# Patient Record
Sex: FEMALE | Race: WHITE | NOT HISPANIC OR LATINO | Employment: FULL TIME | URBAN - NONMETROPOLITAN AREA
[De-identification: names, ages, dates, MRNs, and addresses within clinical notes are randomized per-mention and may not be internally consistent; named-entity substitution may affect disease eponyms.]

---

## 2019-05-22 ENCOUNTER — OFFICE VISIT (OUTPATIENT)
Dept: CARDIOLOGY | Facility: CLINIC | Age: 74
End: 2019-05-22

## 2019-05-22 VITALS
SYSTOLIC BLOOD PRESSURE: 146 MMHG | WEIGHT: 181 LBS | HEIGHT: 64 IN | HEART RATE: 70 BPM | BODY MASS INDEX: 30.9 KG/M2 | DIASTOLIC BLOOD PRESSURE: 80 MMHG

## 2019-05-22 DIAGNOSIS — E88.81 METABOLIC SYNDROME: ICD-10-CM

## 2019-05-22 DIAGNOSIS — R07.89 CHEST TIGHTNESS: ICD-10-CM

## 2019-05-22 DIAGNOSIS — I44.7 LBBB (LEFT BUNDLE BRANCH BLOCK): Primary | ICD-10-CM

## 2019-05-22 DIAGNOSIS — I10 ESSENTIAL HYPERTENSION: ICD-10-CM

## 2019-05-22 DIAGNOSIS — K21.9 CHEST PAIN DUE TO GERD: ICD-10-CM

## 2019-05-22 DIAGNOSIS — R07.9 CHEST PAIN DUE TO GERD: ICD-10-CM

## 2019-05-22 DIAGNOSIS — E78.00 HYPERCHOLESTEREMIA: ICD-10-CM

## 2019-05-22 DIAGNOSIS — R00.2 PALPITATIONS: ICD-10-CM

## 2019-05-22 PROCEDURE — 93000 ELECTROCARDIOGRAM COMPLETE: CPT | Performed by: INTERNAL MEDICINE

## 2019-05-22 PROCEDURE — 99204 OFFICE O/P NEW MOD 45 MIN: CPT | Performed by: INTERNAL MEDICINE

## 2019-05-22 RX ORDER — HYDROXYCHLOROQUINE SULFATE 200 MG/1
TABLET, FILM COATED ORAL 2 TIMES DAILY
COMMUNITY
End: 2020-05-19 | Stop reason: ALTCHOICE

## 2019-05-22 RX ORDER — AMLODIPINE BESYLATE 5 MG/1
5 TABLET ORAL DAILY
Qty: 30 TABLET | Refills: 11 | Status: SHIPPED | OUTPATIENT
Start: 2019-05-22 | End: 2020-05-19 | Stop reason: SDUPTHER

## 2019-05-22 RX ORDER — DICYCLOMINE HYDROCHLORIDE 10 MG/1
10 CAPSULE ORAL
Qty: 30 CAPSULE | Refills: 8 | Status: SHIPPED | OUTPATIENT
Start: 2019-05-22 | End: 2021-07-14

## 2019-05-22 RX ORDER — PAROXETINE HYDROCHLORIDE 20 MG/1
20 TABLET, FILM COATED ORAL EVERY MORNING
COMMUNITY

## 2019-05-22 RX ORDER — PRAVASTATIN SODIUM 10 MG
10 TABLET ORAL DAILY
COMMUNITY
End: 2020-11-19 | Stop reason: SDUPTHER

## 2019-05-22 RX ORDER — LISINOPRIL AND HYDROCHLOROTHIAZIDE 25; 20 MG/1; MG/1
1 TABLET ORAL DAILY
COMMUNITY
End: 2020-11-19 | Stop reason: SDUPTHER

## 2019-05-22 RX ORDER — ERGOCALCIFEROL 1.25 MG/1
50000 CAPSULE ORAL WEEKLY
COMMUNITY

## 2019-05-22 RX ORDER — MELOXICAM 15 MG/1
15 TABLET ORAL DAILY
COMMUNITY
End: 2020-05-19 | Stop reason: ALTCHOICE

## 2019-05-22 RX ORDER — ESOMEPRAZOLE MAGNESIUM 40 MG/1
40 CAPSULE, DELAYED RELEASE ORAL
COMMUNITY

## 2019-05-22 NOTE — PATIENT INSTRUCTIONS
Mediterranean Diet  A Mediterranean diet refers to food and lifestyle choices that are based on the traditions of countries located on the [place]. This way of eating has been shown to help prevent certain conditions and improve outcomes for people who have chronic diseases, like kidney disease and heart disease.  What are tips for following this plan?  Lifestyle  · Cook and eat meals together with your family, when possible.  · Drink enough fluid to keep your urine clear or pale yellow.  · Be physically active every day. This includes:  ? Aerobic exercise like running or swimming.  ? Leisure activities like gardening, walking, or housework.  · Get 7-8 hours of sleep each night.  · If recommended by your health care provider, drink red wine in moderation. This means 1 glass a day for nonpregnant women and 2 glasses a day for men. A glass of wine equals 5 oz (150 mL).  Reading food labels  · Check the serving size of packaged foods. For foods such as rice and pasta, the serving size refers to the amount of cooked product, not dry.  · Check the total fat in packaged foods. Avoid foods that have saturated fat or trans fats.  · Check the ingredients list for added sugars, such as corn syrup.  Shopping  · At the grocery store, buy most of your food from the areas near the walls of the store. This includes:  ? Fresh fruits and vegetables (produce).  ? Grains, beans, nuts, and seeds. Some of these may be available in unpackaged forms or large amounts (in bulk).  ? Fresh seafood.  ? Poultry and eggs.  ? Low-fat dairy products.  · Buy whole ingredients instead of prepackaged foods.  · Buy fresh fruits and vegetables in-season from local farmers markets.  · Buy frozen fruits and vegetables in resealable bags.  · If you do not have access to quality fresh seafood, buy precooked frozen shrimp or canned fish, such as tuna, salmon, or sardines.  · Buy small amounts of raw or cooked vegetables, salads, or olives from the  deli or salad bar at your store.  · Stock your pantry so you always have certain foods on hand, such as olive oil, canned tuna, canned tomatoes, rice, pasta, and beans.  Cooking  · Cook foods with extra-virgin olive oil instead of using butter or other vegetable oils.  · Have meat as a side dish, and have vegetables or grains as your main dish. This means having meat in small portions or adding small amounts of meat to foods like pasta or stew.  · Use beans or vegetables instead of meat in common dishes like chili or lasagna.  · [place] with different cooking methods. Try roasting or broiling vegetables instead of steaming or sautéeing them.  · Add frozen vegetables to soups, stews, pasta, or rice.  · Add nuts or seeds for added healthy fat at each meal. You can add these to yogurt, salads, or vegetable dishes.  · Marinate fish or vegetables using olive oil, lemon juice, garlic, and fresh herbs.  Meal planning  · Plan to eat 1 vegetarian meal one day each week. Try to work up to 2 vegetarian meals, if possible.  · Eat seafood 2 or more times a week.  · Have healthy snacks readily available, such as:  ? Vegetable sticks with hummus.  ? Greek yogurt.  ? Fruit and nut trail mix.  · Eat balanced meals throughout the week. This includes:  ? Fruit: 2-3 servings a day  ? Vegetables: 4-5 servings a day  ? Low-fat dairy: 2 servings a day  ? Fish, poultry, or lean meat: 1 serving a day  ? Beans and legumes: 2 or more servings a week  ? Nuts and seeds: 1-2 servings a day  ? Whole grains: 6-8 servings a day  ? Extra-virgin olive oil: 3-4 servings a day  · Limit red meat and sweets to only a few servings a month  What are my food choices?  · Mediterranean diet  ? Recommended  ? Grains: Whole-grain pasta. Brown rice. Bulgar wheat. Polenta. Couscous. Whole-wheat bread. Oatmeal. Quinoa.  ? Vegetables: Artichokes. Beets. Broccoli. Cabbage. Carrots. Eggplant. Green beans. Chard. Kale. Spinach. Onions. Leeks. Peas. Squash.  Tomatoes. Peppers. Radishes.  ? Fruits: Apples. Apricots. Avocado. Berries. Bananas. Cherries. Dates. Figs. Grapes. Yousif. Melon. Oranges. Peaches. Plums. Pomegranate.  ? Meats and other protein foods: Beans. Almonds. Sunflower seeds. Pine nuts. Peanuts. Cod. [place]. Scallops. Shrimp. Tuna. Tilapia. Clams. Oysters. Eggs.  ? Dairy: Low-fat milk. Cheese. Greek yogurt.  ? Beverages: Water. Red wine. Herbal tea.  ? Fats and oils: Extra virgin olive oil. Avocado oil. Grape seed oil.  ? Sweets and desserts: Greek yogurt with honey. Baked apples. Poached pears. Trail mix.  ? Seasoning and other foods: Basil. Cilantro. Coriander. Cumin. Mint. Parsley. Pk. Rosemary. Tarragon. Garlic. Oregano. Thyme. Pepper. Balsalmic vinegar. Tahini. Hummus. Tomato sauce. Olives. Mushrooms.  ? Limit these  ? Grains: Prepackaged pasta or rice dishes. Prepackaged cereal with added sugar.  ? Vegetables: Deep fried potatoes (french fries).  ? Fruits: Fruit canned in syrup.  ? Meats and other protein foods: Beef. Pork. Lamb. Poultry with skin. Hot dogs. Car.  ? Dairy: Ice cream. Sour cream. Whole milk.  ? Beverages: Juice. Sugar-sweetened soft drinks. Beer. Liquor and spirits.  ? Fats and oils: Butter. Canola oil. Vegetable oil. Beef fat (tallow). Lard.  ? Sweets and desserts: Cookies. Cakes. Pies. Candy.  ? Seasoning and other foods: Mayonnaise. Premade sauces and marinades.  ? The items listed may not be a complete list. Talk with your dietitian about what dietary choices are right for you.  Summary  · The Mediterranean diet includes both food and lifestyle choices.  · Eat a variety of fresh fruits and vegetables, beans, nuts, seeds, and whole grains.  · Limit the amount of red meat and sweets that you eat.  · Talk with your health care provider about whether it is safe for you to drink red wine in moderation. This means 1 glass a day for nonpregnant women and 2 glasses a day for men. A glass of wine equals 5 oz (150 mL).  This information  is not intended to replace advice given to you by your health care provider. Make sure you discuss any questions you have with your health care provider.  Document Released: 08/10/2017 Document Revised: 09/12/2017 Document Reviewed: 08/10/2017  [employer] Interactive Patient Education © 2019 [employer]

## 2019-05-29 ENCOUNTER — HOSPITAL ENCOUNTER (OUTPATIENT)
Dept: CARDIOLOGY | Facility: HOSPITAL | Age: 74
Discharge: HOME | End: 2019-05-29

## 2019-05-29 DIAGNOSIS — R07.89 CHEST TIGHTNESS: ICD-10-CM

## 2019-05-29 DIAGNOSIS — I44.7 LBBB (LEFT BUNDLE BRANCH BLOCK): ICD-10-CM

## 2019-05-29 LAB
BH CV NUCLEAR PRIOR STUDY: 3
BH CV STRESS COMMENTS STAGE 1: NORMAL
BH CV STRESS DOSE REGADENOSON STAGE 1: 0.4
BH CV STRESS DURATION MIN STAGE 1: 0
BH CV STRESS DURATION SEC STAGE 1: 10
BH CV STRESS PROTOCOL 1: NORMAL
BH CV STRESS RECOVERY BP: NORMAL MMHG
BH CV STRESS RECOVERY HR: 85 BPM
BH CV STRESS STAGE 1: 1
LV EF NUC BP: 69 %
MAXIMAL PREDICTED HEART RATE: 146 BPM
PERCENT MAX PREDICTED HR: 64.38 %
STRESS BASELINE BP: NORMAL MMHG
STRESS BASELINE HR: 73 BPM
STRESS PERCENT HR: 76 %
STRESS POST PEAK BP: NORMAL MMHG
STRESS POST PEAK HR: 94 BPM
STRESS TARGET HR: 124 BPM

## 2019-05-29 PROCEDURE — 93018 CV STRESS TEST I&R ONLY: CPT | Performed by: INTERNAL MEDICINE

## 2019-05-29 PROCEDURE — 0 TECHNETIUM SESTAMIBI: Performed by: INTERNAL MEDICINE

## 2019-05-29 PROCEDURE — 78452 HT MUSCLE IMAGE SPECT MULT: CPT | Performed by: INTERNAL MEDICINE

## 2019-05-29 PROCEDURE — 78452 HT MUSCLE IMAGE SPECT MULT: CPT

## 2019-05-29 PROCEDURE — 25010000002 REGADENOSON 0.4 MG/5ML SOLUTION: Performed by: INTERNAL MEDICINE

## 2019-05-29 PROCEDURE — 93017 CV STRESS TEST TRACING ONLY: CPT

## 2019-05-29 PROCEDURE — A9500 TC99M SESTAMIBI: HCPCS | Performed by: INTERNAL MEDICINE

## 2019-05-29 RX ADMIN — REGADENOSON 0.4 MG: 0.08 INJECTION, SOLUTION INTRAVENOUS at 08:42

## 2019-05-29 RX ADMIN — TECHNETIUM TC 99M SESTAMIBI 1 DOSE: 1 INJECTION INTRAVENOUS at 08:42

## 2019-05-29 RX ADMIN — TECHNETIUM TC 99M SESTAMIBI 1 DOSE: 1 INJECTION INTRAVENOUS at 08:41

## 2019-11-18 ENCOUNTER — OFFICE VISIT (OUTPATIENT)
Dept: CARDIOLOGY | Facility: CLINIC | Age: 74
End: 2019-11-18

## 2019-11-18 VITALS
BODY MASS INDEX: 31.48 KG/M2 | SYSTOLIC BLOOD PRESSURE: 120 MMHG | HEART RATE: 68 BPM | HEIGHT: 64 IN | DIASTOLIC BLOOD PRESSURE: 70 MMHG | WEIGHT: 184.4 LBS

## 2019-11-18 DIAGNOSIS — I10 ESSENTIAL HYPERTENSION: Primary | ICD-10-CM

## 2019-11-18 DIAGNOSIS — E78.00 HYPERCHOLESTEREMIA: ICD-10-CM

## 2019-11-18 DIAGNOSIS — R00.2 PALPITATIONS: ICD-10-CM

## 2019-11-18 DIAGNOSIS — I44.7 LBBB (LEFT BUNDLE BRANCH BLOCK): ICD-10-CM

## 2019-11-18 PROCEDURE — 99214 OFFICE O/P EST MOD 30 MIN: CPT | Performed by: NURSE PRACTITIONER

## 2019-11-18 NOTE — PROGRESS NOTES
"Chief Complaint   Patient presents with   • Follow-up     Cardiac management. She states \"I can not tell any difference since starting Bentyl, I feel it is just what I eat\".    • Blood pressure     She reports B/P is fluctuating, 97//60s. HR 60s-80s.   • Fatigue     She states \"just don't have any energy\".   • Irregular Heart Beat     At times feels like heart skips a beat.       Subjective       Jaki Camacho is a 74 y.o. female with a history of HTN, hyperlipidemia, who was referred for cardiac evaluation in May 2019 secondary to chest tightness and dyspnea on exertion. She also reported occasional self-limiting palpitations occurring 2-3 weekly lasting a few seconds. She had an EKG which showed LBBB. Echo done by her PCP showed possible septal wall motion abnormality and was referred for further evaluation. Blood pressure was mildly elevated at consult, amlodipine 5 mg added. Stress test was done to rule out ischemia. Lexiscan on 5/29/19 showed normal LVEF with changes in the septum felt to be related to the BBB but could not rule out infarct. No ischemia was seen. Medical management recommended unless she developed more symptoms, then cardiac cath would be recommended. Lipids have been well controlled with LDL 86 on low dose pravastatin. She came today for follow up. Overall, she feels improved. She denies chest pain. She has mild dyspnea with walking longer distances or uphill. She does her housework without difficulty as long as she paces herself. BP has been mostly well controlled at home. Occasionally increases to 150-160 systolic. Rare palpitations. Labs to be rechecked with PCP.   HPI         Cardiac History:    Past Surgical History:   Procedure Laterality Date   • CARDIOVASCULAR STRESS TEST  05/29/2019    L.Cardiolite- EF 69%. Septal Infarct Vs LBBB.   • ECHO - CONVERTED  04/25/2019    @ [facility]. EF 65%. ? Septal WMA. AO- 3.7 Cm. RVSP- 38 mmHg.       Current Outpatient Medications   Medication Sig " Dispense Refill   • amLODIPine (NORVASC) 5 MG tablet Take 1 tablet by mouth Daily. 30 tablet 11   • Aspirin Effervescent (KADE-SELTZER PO) Take  by mouth 2 (Two) Times a Day As Needed.     • dicyclomine (BENTYL) 10 MG capsule Take 1 capsule by mouth every night at bedtime. 30 capsule 8   • esomeprazole (nexIUM) 40 MG capsule Take 40 mg by mouth Every Morning Before Breakfast.     • hydroxychloroquine (PLAQUENIL) 200 MG tablet Take  by mouth 2 (Two) Times a Day.     • lisinopril-hydrochlorothiazide (PRINZIDE,ZESTORETIC) 20-25 MG per tablet Take 1 tablet by mouth Daily.     • meloxicam (MOBIC) 15 MG tablet Take 15 mg by mouth Daily.     • PARoxetine (PAXIL) 20 MG tablet Take 20 mg by mouth Every Morning.     • pravastatin (PRAVACHOL) 10 MG tablet Take 10 mg by mouth Daily.     • vitamin D (ERGOCALCIFEROL) 74350 units capsule capsule Take 50,000 Units by mouth 1 (One) Time Per Week.       No current facility-administered medications for this visit.        Patient has no known allergies.    Past Medical History:   Diagnosis Date   • Abnormal ECG    • GERD (gastroesophageal reflux disease)    • History of partial thyroidectomy    • Hyperlipidemia    • Hypertension    • Osteoarthritis    • Vitamin D deficiency        Social History     Socioeconomic History   • Marital status:      Spouse name: Not on file   • Number of children: Not on file   • Years of education: Not on file   • Highest education level: Not on file   Tobacco Use   • Smoking status: Never Smoker   • Smokeless tobacco: Never Used   Substance and Sexual Activity   • Alcohol use: No     Frequency: Never   • Drug use: No       Family History   Problem Relation Age of Onset   • Heart attack Mother          at 80 with MI   • Heart failure Mother    • Heart attack Father          at 82 with MI   • Heart failure Father    • Valvular heart disease Brother         valve replacement at 67 years old   • Heart disease Brother         CABG    • Cancer  "Brother    • No Known Problems Daughter      Review of Systems   Constitution: Positive for weight gain (up 3 lb). Negative for decreased appetite, weakness and malaise/fatigue.   HENT: Negative.    Eyes: Negative.    Cardiovascular: Positive for dyspnea on exertion (mild ) and palpitations. Negative for chest pain, leg swelling and syncope.   Respiratory: Negative for cough and shortness of breath.    Endocrine: Negative.    Hematologic/Lymphatic: Negative.    Skin: Negative.    Musculoskeletal: Negative for falls and myalgias.   Gastrointestinal: Negative for abdominal pain and melena.   Genitourinary: Negative for dysuria and hematuria.   Neurological: Negative for dizziness.   Psychiatric/Behavioral: Negative for altered mental status and depression.   Allergic/Immunologic: Negative.       Diabetes- No  Thyroid-normal    Objective     /70 (BP Location: Left arm)   Pulse 68   Ht 162.6 cm (64.02\")   Wt 83.6 kg (184 lb 6.4 oz)   BMI 31.64 kg/m²     Physical Exam   Constitutional: She is oriented to person, place, and time. She appears well-developed and well-nourished. No distress.   HENT:   Head: Normocephalic.   Eyes: Pupils are equal, round, and reactive to light.   Neck: Normal range of motion.   Cardiovascular: Normal rate, regular rhythm and intact distal pulses.   Pulmonary/Chest: Effort normal and breath sounds normal. No respiratory distress.   Abdominal: Soft. Bowel sounds are normal.   Musculoskeletal: Normal range of motion. She exhibits no edema.   Neurological: She is alert and oriented to person, place, and time.   Skin: Skin is warm and dry. She is not diaphoretic.   Psychiatric: She has a normal mood and affect.   Nursing note and vitals reviewed.     Procedures          Assessment/Plan      Jaki was seen today for follow-up, blood pressure, fatigue and irregular heart beat.    Diagnoses and all orders for this visit:    Essential hypertension    Hypercholesteremia    LBBB (left bundle " branch block)    Palpitations      Heart rate and blood pressure stable. She is tolerating amlodipine without side effects. We reviewed her stress test and echocardiogram which showed possible septal infarct vs LBBB, no ischemia. Her LV function is normal. She has no new or worsening symptoms, so we will continue medical management at this time. She understands to report any anginal symptoms. Continue pravastatin for management of lipids as LDL well controlled. She plans to follow with Angela Pizarro for management of labs. Palpitations are infrequent and not associated with dizziness or CP, no further intervention. TSH was normal. She may need a holter in the future if worsen. She was encouraged on heart healthy lifestyle, limiting sodium, saturated fat, sugar. Regular walking encouraged. She appears to be stable from a cardiac standpoint. We will see her back in six months or sooner if she develops any new symptoms.     Patient's Body mass index is 31.64 kg/m². BMI is above normal parameters. Recommendations include: nutrition counseling.               Electronically signed by KAREN Estevez,  November 20, 2019 6:58 AM

## 2020-02-25 ENCOUNTER — TELEPHONE (OUTPATIENT)
Dept: CARDIOLOGY | Facility: CLINIC | Age: 75
End: 2020-02-25

## 2020-02-25 NOTE — TELEPHONE ENCOUNTER
Patient called stating that Dr. Marvin took her off of mobic and plaquenil stating that she shouldn't take them due to her heart. He put her on tylenol and tramadol. She is asking if you think there is a reason she shouldn't take mobic or plaquenil? She states that tramadol and tylenol are not helping her arthritis pain.

## 2020-02-25 NOTE — TELEPHONE ENCOUNTER
Her stress test showed no ischemia and blood pressure is well controlled. She has LBBB but her QT is normal.     I don't see an obvious reason why she could not take Plaquenil and Mobic unless he knows something that I do not.     We can forward the last note to his office and let him take a look at her diagnoses.

## 2020-02-27 NOTE — TELEPHONE ENCOUNTER
Patient was made aware that I will send Dr. Marvin's office our last office note and that Mirela did not see an obvious reason that she could not take mobic and plaquenil. She states that she will be following up with their office in April 2020.

## 2020-03-26 ENCOUNTER — TELEPHONE (OUTPATIENT)
Dept: CARDIOLOGY | Facility: CLINIC | Age: 75
End: 2020-03-26

## 2020-03-26 DIAGNOSIS — R00.2 PALPITATIONS: Primary | ICD-10-CM

## 2020-03-26 NOTE — TELEPHONE ENCOUNTER
Pt's niece, Adelaide Camacho, NP, called reported symptoms of increased palpitations, intermittent, doesn't last long but occurs 10-15 times per day. No chest discomfort, shortness of breath, orthopnea, or edema but has mild dyspnea on exertion and fatigue. -160 systolic, HR 70-80.     Reviewed cardiac work up findings with NP. She will add low dose beta blocker. Metoprolol succ 25 mg QD. Will order 3 day holter to be mailed to patient home to evaluate palpitations.

## 2020-05-19 ENCOUNTER — OFFICE VISIT (OUTPATIENT)
Dept: CARDIOLOGY | Facility: CLINIC | Age: 75
End: 2020-05-19

## 2020-05-19 VITALS
SYSTOLIC BLOOD PRESSURE: 110 MMHG | HEIGHT: 64 IN | DIASTOLIC BLOOD PRESSURE: 58 MMHG | TEMPERATURE: 98.4 F | HEART RATE: 68 BPM | BODY MASS INDEX: 32.58 KG/M2 | WEIGHT: 190.8 LBS

## 2020-05-19 DIAGNOSIS — R53.83 OTHER FATIGUE: ICD-10-CM

## 2020-05-19 DIAGNOSIS — E78.00 HYPERCHOLESTEREMIA: ICD-10-CM

## 2020-05-19 DIAGNOSIS — I10 ESSENTIAL HYPERTENSION: ICD-10-CM

## 2020-05-19 DIAGNOSIS — R00.2 PALPITATIONS: ICD-10-CM

## 2020-05-19 DIAGNOSIS — I44.7 LBBB (LEFT BUNDLE BRANCH BLOCK): Primary | ICD-10-CM

## 2020-05-19 DIAGNOSIS — E66.9 OBESITY (BMI 30.0-34.9): ICD-10-CM

## 2020-05-19 PROCEDURE — 99214 OFFICE O/P EST MOD 30 MIN: CPT | Performed by: NURSE PRACTITIONER

## 2020-05-19 RX ORDER — CELECOXIB 50 MG/1
50 CAPSULE ORAL DAILY
COMMUNITY

## 2020-05-19 RX ORDER — AMLODIPINE BESYLATE 5 MG/1
5 TABLET ORAL DAILY
Qty: 90 TABLET | Refills: 2 | Status: SHIPPED | OUTPATIENT
Start: 2020-05-19 | End: 2020-11-19 | Stop reason: SDUPTHER

## 2020-05-19 RX ORDER — METOPROLOL SUCCINATE 25 MG/1
25 TABLET, EXTENDED RELEASE ORAL DAILY
Qty: 90 TABLET | Refills: 2 | Status: SHIPPED | OUTPATIENT
Start: 2020-05-19 | End: 2020-11-19 | Stop reason: SDUPTHER

## 2020-05-19 RX ORDER — METOPROLOL SUCCINATE 25 MG/1
25 TABLET, EXTENDED RELEASE ORAL DAILY
Qty: 90 TABLET | Refills: 2 | Status: SHIPPED | OUTPATIENT
Start: 2020-05-19 | End: 2020-05-19 | Stop reason: SDUPTHER

## 2020-05-19 RX ORDER — METOPROLOL SUCCINATE 25 MG/1
25 TABLET, EXTENDED RELEASE ORAL DAILY
COMMUNITY
End: 2020-05-19 | Stop reason: SDUPTHER

## 2020-05-19 RX ORDER — AMLODIPINE BESYLATE 5 MG/1
5 TABLET ORAL DAILY
Qty: 30 TABLET | Refills: 11 | Status: SHIPPED | OUTPATIENT
Start: 2020-05-19 | End: 2020-05-19 | Stop reason: SDUPTHER

## 2020-05-19 NOTE — PROGRESS NOTES
Chief Complaint   Patient presents with   • Follow-up     Cardiac management.   • Lab     Last labs 1/2020 per PCP.   • Blood pressure     Readings 99/61 to 136/66 with HR 60-69 since starting Toprol XL. Has felt more tired since starting Toprol.   • Med Refill     Needs refills on Norvasc and Toprol xl-90 day. Patient verbalized current medication list.       Cardiac Complaints  fatigue      Subjective   Jaki Camacho is a 75 y.o. female  with HTN, palpitations, BBB, and hyperlipidemia, who was referred for cardiac evaluation in May 2019 secondary to chest tightness and dyspnea on exertion. She also reported occasional self-limiting palpitations occurring 2-3 weekly lasting a few seconds. She had an EKG which showed LBBB. Echo done by her PCP showed possible septal wall motion abnormality and was referred for further evaluation. Blood pressure was mildly elevated at consult, amlodipine 5 mg added. Stress test was done to rule out ischemia. Lexiscan on 5/29/19 showed normal LVEF with changes in the septum felt to be related to the BBB but could not rule out infarct. No ischemia was seen. Medical management recommended unless she developed more symptoms, then cardiac cath would be recommended.  She then called in March 2020 with more palpitations, toprol XL added for management. Holter done in April 2020 showed rare PVCs with 2 short runs of SVT, toprol XL continued at current.     She returns today for follow up and reports continued fatigue. She does feel like it has worsened slightly since starting BB therapy. She does report some issues with blood pressure variation and admits it often runs in the 90's SBP.  She denies any chest pain, palpitations, SOA, dizziness, or syncope. Labs she states remain followed by PCP and are to be rechecked this summer. Refills of norvasc and toprol requested.         Cardiac History  Past Surgical History:   Procedure Laterality Date   • CARDIOVASCULAR STRESS TEST  05/29/2019     L.Cardiolite- EF 69%. Septal Infarct Vs LBBB.   • ECHO - CONVERTED  2019    @ [facility]. EF 65%. ? Septal WMA. AO- 3.7 Cm. RVSP- 38 mmHg.       Current Outpatient Medications   Medication Sig Dispense Refill   • amLODIPine (NORVASC) 5 MG tablet Take 1 tablet by mouth Daily. 90 tablet 2   • Aspirin Effervescent (KADE-SELTZER PO) Take  by mouth 2 (Two) Times a Day As Needed.     • celecoxib (CeleBREX) 50 MG capsule Take 50 mg by mouth Daily.     • dicyclomine (BENTYL) 10 MG capsule Take 1 capsule by mouth every night at bedtime. 30 capsule 8   • esomeprazole (nexIUM) 40 MG capsule Take 40 mg by mouth Every Morning Before Breakfast.     • lisinopril-hydrochlorothiazide (PRINZIDE,ZESTORETIC) 20-25 MG per tablet Take 1 tablet by mouth Daily.     • metoprolol succinate XL (TOPROL-XL) 25 MG 24 hr tablet Take 1 tablet by mouth Daily. 90 tablet 2   • PARoxetine (PAXIL) 20 MG tablet Take 20 mg by mouth Every Morning.     • pravastatin (PRAVACHOL) 10 MG tablet Take 10 mg by mouth Daily.     • vitamin D (ERGOCALCIFEROL) 54294 units capsule capsule Take 50,000 Units by mouth 1 (One) Time Per Week.       No current facility-administered medications for this visit.        Patient has no known allergies.    Past Medical History:   Diagnosis Date   • Abnormal ECG    • GERD (gastroesophageal reflux disease)    • History of partial thyroidectomy    • Hyperlipidemia    • Hypertension    • Osteoarthritis    • Vitamin D deficiency        Social History     Socioeconomic History   • Marital status:      Spouse name: Not on file   • Number of children: Not on file   • Years of education: Not on file   • Highest education level: Not on file   Tobacco Use   • Smoking status: Never Smoker   • Smokeless tobacco: Never Used   Substance and Sexual Activity   • Alcohol use: No     Frequency: Never   • Drug use: No       Family History   Problem Relation Age of Onset   • Heart attack Mother          at 80 with MI   • Heart failure  "Mother    • Heart attack Father          at 82 with MI   • Heart failure Father    • Valvular heart disease Brother         valve replacement at 67 years old   • Heart disease Brother         CABG    • Cancer Brother    • No Known Problems Daughter        Review of Systems   Constitution: Positive for malaise/fatigue and night sweats.   Cardiovascular: Negative for chest pain, claudication, dyspnea on exertion, irregular heartbeat, leg swelling, near-syncope, palpitations and syncope.   Respiratory: Negative for cough, shortness of breath and wheezing.    Musculoskeletal: Negative for back pain, joint pain and joint swelling.   Gastrointestinal: Negative for anorexia, heartburn, melena, nausea and vomiting.   Genitourinary: Negative for dysuria, hematuria, hesitancy and nocturia.   Neurological: Negative for dizziness, light-headedness and loss of balance.   Psychiatric/Behavioral: Negative for depression and memory loss. The patient is not nervous/anxious.            Objective     /58 (BP Location: Right arm)   Pulse 68   Temp 98.4 °F (36.9 °C)   Ht 162.6 cm (64.02\")   Wt 86.5 kg (190 lb 12.8 oz)   BMI 32.73 kg/m²     Physical Exam   Constitutional: She is oriented to person, place, and time. She appears well-developed and well-nourished.   HENT:   Head: Normocephalic and atraumatic.   Eyes: Pupils are equal, round, and reactive to light. EOM are normal.   Neck: Normal range of motion. Neck supple.   Cardiovascular: Normal rate and regular rhythm.   Murmur heard.  Pulmonary/Chest: Effort normal and breath sounds normal.   Abdominal: Soft.   Musculoskeletal: Normal range of motion.   Neurological: She is alert and oriented to person, place, and time.   Skin: Skin is warm and dry.   Psychiatric: She has a normal mood and affect. Her behavior is normal.       Procedures    Assessment/Plan     HTN:  Blood pressure stable and running low normal. Since she is more fatigued, she will try and cut her BB " therapy to 1/2 tablet daily.  She will continue on current norvasc therapy at current for now.     Palpitations:  Well managed with BB therapy.  She denies any concerns.  For side effects of fatigue, dosing to be decreased to 1/2 tablet with patient urged to use extra 1/2 tablet for palpitations if needed.    Hyperlipidemia:  Tolerates statin therapy well. For now, same dosing of pravachol therapy continued. FLP followed by PCP and to be rechecked this summer. Limited saturated fat intake advised.    BMI noted at 32.73, good cardiac diet with limited carbs, calories, and activity as tolerated advised.     Cardiac status is stable currently, no cardiac cath will be recommended at present.    Refills sent per request.    6 month follow up recommended or sooner if needed.       Problems Addressed this Visit        Cardiovascular and Mediastinum    LBBB (left bundle branch block) - Primary    Relevant Medications    metoprolol succinate XL (TOPROL-XL) 25 MG 24 hr tablet    Palpitations    Hypercholesteremia    Essential hypertension    Relevant Medications    metoprolol succinate XL (TOPROL-XL) 25 MG 24 hr tablet    amLODIPine (NORVASC) 5 MG tablet      Other Visit Diagnoses     Other fatigue        Obesity (BMI 30.0-34.9)              Patient's Body mass index is 32.73 kg/m². BMI is above normal parameters. Recommendations include: nutrition counseling.              Electronically signed by KAREN Carias May 19, 2020 10:56

## 2020-11-19 ENCOUNTER — OFFICE VISIT (OUTPATIENT)
Dept: CARDIOLOGY | Facility: CLINIC | Age: 75
End: 2020-11-19

## 2020-11-19 VITALS
DIASTOLIC BLOOD PRESSURE: 70 MMHG | TEMPERATURE: 97.8 F | BODY MASS INDEX: 29.37 KG/M2 | HEART RATE: 60 BPM | WEIGHT: 172 LBS | SYSTOLIC BLOOD PRESSURE: 120 MMHG | HEIGHT: 64 IN

## 2020-11-19 DIAGNOSIS — I10 ESSENTIAL HYPERTENSION: ICD-10-CM

## 2020-11-19 DIAGNOSIS — R00.2 PALPITATIONS: Primary | ICD-10-CM

## 2020-11-19 DIAGNOSIS — E78.00 HYPERCHOLESTEREMIA: ICD-10-CM

## 2020-11-19 DIAGNOSIS — E66.3 OVERWEIGHT: ICD-10-CM

## 2020-11-19 DIAGNOSIS — E11.9 TYPE 2 DIABETES MELLITUS WITHOUT COMPLICATION, WITHOUT LONG-TERM CURRENT USE OF INSULIN (HCC): ICD-10-CM

## 2020-11-19 PROCEDURE — 99214 OFFICE O/P EST MOD 30 MIN: CPT | Performed by: NURSE PRACTITIONER

## 2020-11-19 RX ORDER — ASPIRIN 81 MG/1
81 TABLET ORAL DAILY
COMMUNITY

## 2020-11-19 RX ORDER — LISINOPRIL AND HYDROCHLOROTHIAZIDE 25; 20 MG/1; MG/1
1 TABLET ORAL DAILY
Qty: 30 TABLET | Refills: 8 | Status: SHIPPED | OUTPATIENT
Start: 2020-11-19 | End: 2021-07-14 | Stop reason: SDUPTHER

## 2020-11-19 RX ORDER — AMLODIPINE BESYLATE 5 MG/1
5 TABLET ORAL DAILY
Qty: 30 TABLET | Refills: 8 | Status: SHIPPED | OUTPATIENT
Start: 2020-11-19 | End: 2021-07-14 | Stop reason: SDUPTHER

## 2020-11-19 RX ORDER — METOPROLOL SUCCINATE 25 MG/1
25 TABLET, EXTENDED RELEASE ORAL DAILY
Qty: 30 TABLET | Refills: 8 | Status: SHIPPED | OUTPATIENT
Start: 2020-11-19 | End: 2021-07-14

## 2020-11-19 RX ORDER — PRAVASTATIN SODIUM 10 MG
10 TABLET ORAL DAILY
Qty: 30 TABLET | Refills: 8 | Status: SHIPPED | OUTPATIENT
Start: 2020-11-19 | End: 2021-07-14 | Stop reason: SDUPTHER

## 2020-11-19 NOTE — PROGRESS NOTES
Chief Complaint   Patient presents with   • Follow-up     For cardiac management. Patient is not on aspirin. Last lab work was done within the last couple of months per PCP, not in chart. States that she was put on metformin. States that she has been tired some. States that she occasionally has palpitations.    • Med Refill     Needs refills on cardiac medications. 30 day supplies to [facility]. Went over medications verbally.        Cardiac Complaints  palpitations      Subjective   Jaki Camacho is a 75 y.o. female  with HTN, palpitations, BBB, and hyperlipidemia, who was referred for cardiac evaluation in May 2019 secondary to chest tightness and dyspnea on exertion. She also reported occasional self-limiting palpitations occurring 2-3 weekly lasting a few seconds. She had an EKG which showed LBBB. Echo done by her PCP showed possible septal wall motion abnormality and was referred for further evaluation. Blood pressure was mildly elevated at consult, amlodipine 5 mg added. Stress test was done to rule out ischemia. Lexiscan on 5/29/19 showed normal LVEF with changes in the septum felt to be related to the BBB but could not rule out infarct. No ischemia was seen. Medical management recommended unless she developed more symptoms, then cardiac cath would be recommended.  She then called in March 2020 with more palpitations, toprol XL added for management. Holter done in April 2020 showed rare PVCs with 2 short runs of SVT, toprol XL continued at current.     Patient returns today for follow up and new cardiac concerns. She does report fatigue and palpitations at times but no worse than prior and actually improved. She has been recently put on metformin therapy for elevated glucose, tolerating well. Patient was advised to alter her diet and has been limiting carbs/calories and walking regimen advised. No labs are available but she reports you recently checked, can we have for our records?  Refills  requested for 30 day supply.        Cardiac History  Past Surgical History:   Procedure Laterality Date   • CARDIOVASCULAR STRESS TEST  05/29/2019    L.Cardiolite- EF 69%. Septal Infarct Vs LBBB.   • CONVERTED (HISTORICAL) HOLTER  04/19/2020    NSR, few PVC, short runs of SVT   • ECHO - CONVERTED  04/25/2019    @ [facility]. EF 65%. ? Septal WMA. AO- 3.7 Cm. RVSP- 38 mmHg.       Current Outpatient Medications   Medication Sig Dispense Refill   • amLODIPine (NORVASC) 5 MG tablet Take 1 tablet by mouth Daily. 30 tablet 8   • aspirin 81 MG EC tablet Take 81 mg by mouth Daily.     • Aspirin Effervescent (KADE-SELTZER PO) Take  by mouth 2 (Two) Times a Day As Needed.     • celecoxib (CeleBREX) 50 MG capsule Take 50 mg by mouth Daily.     • dicyclomine (BENTYL) 10 MG capsule Take 1 capsule by mouth every night at bedtime. 30 capsule 8   • esomeprazole (nexIUM) 40 MG capsule Take 40 mg by mouth Every Morning Before Breakfast.     • lisinopril-hydrochlorothiazide (PRINZIDE,ZESTORETIC) 20-25 MG per tablet Take 1 tablet by mouth Daily. 30 tablet 8   • metFORMIN (GLUCOPHAGE) 500 MG tablet Take 500 mg by mouth 2 (Two) Times a Day With Meals.     • metoprolol succinate XL (TOPROL-XL) 25 MG 24 hr tablet Take 1 tablet by mouth Daily. 30 tablet 8   • PARoxetine (PAXIL) 20 MG tablet Take 20 mg by mouth Every Morning.     • pravastatin (PRAVACHOL) 10 MG tablet Take 1 tablet by mouth Daily. 30 tablet 8   • vitamin D (ERGOCALCIFEROL) 45851 units capsule capsule Take 50,000 Units by mouth 1 (One) Time Per Week.       No current facility-administered medications for this visit.        Patient has no known allergies.    Past Medical History:   Diagnosis Date   • Abnormal ECG    • GERD (gastroesophageal reflux disease)    • History of partial thyroidectomy    • Hyperlipidemia    • Hypertension    • Osteoarthritis    • Vitamin D deficiency        Social History     Socioeconomic History   • Marital status:      Spouse name: Not on file   •  "Number of children: Not on file   • Years of education: Not on file   • Highest education level: Not on file   Tobacco Use   • Smoking status: Never Smoker   • Smokeless tobacco: Never Used   Substance and Sexual Activity   • Alcohol use: No     Frequency: Never   • Drug use: No       Family History   Problem Relation Age of Onset   • Heart attack Mother          at 80 with MI   • Heart failure Mother    • Heart attack Father          at 82 with MI   • Heart failure Father    • Valvular heart disease Brother         valve replacement at 67 years old   • Heart disease Brother         CABG    • Cancer Brother    • No Known Problems Daughter        Review of Systems   Constitution: Positive for malaise/fatigue. Negative for night sweats.   Cardiovascular: Positive for palpitations. Negative for chest pain, dyspnea on exertion, irregular heartbeat, leg swelling, near-syncope, orthopnea and syncope.   Respiratory: Negative for cough, shortness of breath and wheezing.    Musculoskeletal: Positive for stiffness. Negative for back pain and joint pain.   Gastrointestinal: Negative for anorexia, heartburn, melena, nausea and vomiting.   Genitourinary: Negative for dysuria, hematuria, hesitancy and nocturia.   Neurological: Negative for dizziness, light-headedness and loss of balance.   Psychiatric/Behavioral: Negative for depression and memory loss. The patient is not nervous/anxious.            Objective     /70 (BP Location: Left arm)   Pulse 60   Temp 97.8 °F (36.6 °C)   Ht 162.6 cm (64.02\")   Wt 78 kg (172 lb)   BMI 29.51 kg/m²     Constitutional:       Appearance: Healthy appearance. Not in distress.   Eyes:      Pupils: Pupils are equal, round, and reactive to light.   HENT:      Nose: Nose normal.   Neck:      Musculoskeletal: Normal range of motion and neck supple.   Pulmonary:      Effort: Pulmonary effort is normal.      Breath sounds: Normal breath sounds.   Cardiovascular:      PMI at left " midclavicular line. Normal rate. Regular rhythm.      Murmurs: There is a systolic murmur.   Abdominal:      Palpations: Abdomen is soft.   Musculoskeletal: Normal range of motion.   Skin:     General: Skin is warm and dry.   Neurological:      Mental Status: Oriented to person, place and time.         Procedures    Assessment/Plan     Palpitations:  On toprol therapy without concerns. Palpitations well managed. Same dosing of toprol XL continued.    HTN:  Blood pressure stable. No changes to current zestoretic/toprol/norvasc therapy recommended. Limited sodium advised.    Hyperlipidemia:  On statin therapy with pravachol. Patient tolerates well. Current continued. FLP followed by your office. Can we have for review?    Cardiac status:  On ASA therapy without concerns. Bleeding and bruising are denied. Same continued. No repeat workup recommended.    New onset DM:  Now on glucophage therapy.  Patient tolerates well. AIC followed by your office. Can we have for review?      BMI noted at 29.51 with 18 pound weight loss noted. Patient praised for her diet efforts and urged to continue with good diabetic diet with limited carb/starch intake.    Refills per request.    6 month follow up recommended or sooner if needed.         Problems Addressed this Visit        Cardiovascular and Mediastinum    Palpitations - Primary    Hypercholesteremia    Relevant Medications    pravastatin (PRAVACHOL) 10 MG tablet    Essential hypertension    Relevant Medications    amLODIPine (NORVASC) 5 MG tablet    lisinopril-hydrochlorothiazide (PRINZIDE,ZESTORETIC) 20-25 MG per tablet    metoprolol succinate XL (TOPROL-XL) 25 MG 24 hr tablet      Other Visit Diagnoses     Type 2 diabetes mellitus without complication, without long-term current use of insulin (CMS/[facility])        Relevant Medications    metFORMIN (GLUCOPHAGE) 500 MG tablet    Overweight          Diagnoses       Codes Comments    Palpitations    -  Primary ICD-10-CM:  R00.2  ICD-9-CM: 785.1     Essential hypertension     ICD-10-CM: I10  ICD-9-CM: 401.9     Hypercholesteremia     ICD-10-CM: E78.00  ICD-9-CM: 272.0     Type 2 diabetes mellitus without complication, without long-term current use of insulin (CMS/[facility])     ICD-10-CM: E11.9  ICD-9-CM: 250.00     Overweight     ICD-10-CM: E66.3  ICD-9-CM: 278.02           Patient's Body mass index is 29.51 kg/m². BMI is above normal parameters. Recommendations include: nutrition counseling.            Electronically signed by Annelise Sandoval, KAREN November 19, 2020 10:46 EST

## 2021-07-14 ENCOUNTER — OFFICE VISIT (OUTPATIENT)
Dept: CARDIOLOGY | Facility: CLINIC | Age: 76
End: 2021-07-14

## 2021-07-14 VITALS
WEIGHT: 177 LBS | HEIGHT: 64 IN | SYSTOLIC BLOOD PRESSURE: 128 MMHG | DIASTOLIC BLOOD PRESSURE: 64 MMHG | BODY MASS INDEX: 30.22 KG/M2 | HEART RATE: 64 BPM

## 2021-07-14 DIAGNOSIS — I10 ESSENTIAL HYPERTENSION: ICD-10-CM

## 2021-07-14 DIAGNOSIS — E66.9 OBESITY (BMI 30.0-34.9): ICD-10-CM

## 2021-07-14 DIAGNOSIS — R00.2 PALPITATIONS: Primary | ICD-10-CM

## 2021-07-14 DIAGNOSIS — E11.9 TYPE 2 DIABETES MELLITUS WITHOUT COMPLICATION, WITHOUT LONG-TERM CURRENT USE OF INSULIN (HCC): ICD-10-CM

## 2021-07-14 DIAGNOSIS — E78.00 HYPERCHOLESTEREMIA: ICD-10-CM

## 2021-07-14 PROCEDURE — 99213 OFFICE O/P EST LOW 20 MIN: CPT | Performed by: NURSE PRACTITIONER

## 2021-07-14 RX ORDER — AMLODIPINE BESYLATE 5 MG/1
5 TABLET ORAL DAILY
Qty: 90 TABLET | Refills: 2 | Status: SHIPPED | OUTPATIENT
Start: 2021-07-14

## 2021-07-14 RX ORDER — LISINOPRIL AND HYDROCHLOROTHIAZIDE 25; 20 MG/1; MG/1
1 TABLET ORAL DAILY
Qty: 90 TABLET | Refills: 2 | Status: SHIPPED | OUTPATIENT
Start: 2021-07-14

## 2021-07-14 RX ORDER — PRAVASTATIN SODIUM 10 MG
10 TABLET ORAL DAILY
Qty: 90 TABLET | Refills: 2 | Status: SHIPPED | OUTPATIENT
Start: 2021-07-14